# Patient Record
Sex: MALE | Race: WHITE
[De-identification: names, ages, dates, MRNs, and addresses within clinical notes are randomized per-mention and may not be internally consistent; named-entity substitution may affect disease eponyms.]

---

## 2017-06-01 ENCOUNTER — HOSPITAL ENCOUNTER (OUTPATIENT)
Dept: HOSPITAL 47 - SLEEP | Age: 67
Discharge: HOME | End: 2017-06-01
Payer: MEDICARE

## 2017-06-01 DIAGNOSIS — Z79.899: ICD-10-CM

## 2017-06-01 DIAGNOSIS — E66.9: ICD-10-CM

## 2017-06-01 DIAGNOSIS — G47.33: Primary | ICD-10-CM

## 2017-06-01 DIAGNOSIS — E78.5: ICD-10-CM

## 2017-06-01 DIAGNOSIS — I10: ICD-10-CM

## 2017-06-01 NOTE — PN
DATE OF SERVICE: 06/01/2017



A 66-year-old gentleman who has been followed in the sleep center for 

treatment of obstructive sleep apnea/hypopnea syndrome.  



Patient continued to use his treatment with CPAP every night for the 

whole night. I checked his CPAP unit. Patient using it 30 out of 30 

nights for more than 4 hours. Average usage is 8.2 hours. CPAP 

pressure is 10 cm of water. No snoring with the machine. [place] 

Sleepiness Scale is 8.  



MEDICATIONS: 

1. Metoprolol. 

2. Losartan. 

3. Nifedipine and 

4. Simvastatin. 



PHYSICAL EXAMINATION: 

GENERAL:  A pleasant patient in no distress. 

VITAL SIGNS:  /80, HR 74, RR 16. Height 5 feet 7 inches. Weight 

204. BMI 31.9. Neck 16-3/4. Oxygen saturation at room air 95%.  

HEENT:  PERRLA, EOMI.  Evaluation of oropharynx showed tongue 

protrudes midline, extremely low position of soft palate.   

NECK:  Supple.  No JVD.  Thyroid is not palpable. 

LUNGS:  Clear to percussion and to auscultation.  Good air exchange.  

No wheezing or rhonchi.  

HEART:  S1, S2 regular.  No murmurs, gallops, or rubs. 

ABDOMEN:  Obese, soft and nontender.  Bowel sounds are present.  No 

organomegaly appreciated.  

EXTREMITIES:  No clubbing or cyanosis. 

CNS:  Awake, alert, and oriented x3.  Cranial nerves 2 to 7 intact.  

There is no fasciculation or atrophy noted.   No focal deficits 

observed.  



IMPRESSION:

1. Obstructive sleep apnea-hypopnea syndrome. Patient demonstrated 

100% compliance with treatment with a pressure at 10 cm of water. No 

snoring. No sleepiness. Benefitting from treatment.  

2. Mild obesity; body mass index of 31.9. 

3. Hypertension. 

4. Hyperlipidemia. 

5. Obesity. 

6. Status post right knee surgery. 



PLAN:

1. Patient will continue to use his CPAP equipment every night for the 

whole night.  

2. Losing weight. 

3. Sleep hygiene with regular time in bed for at least 8 hours. 

4. No driving if feeling any sleepiness. 

5. Prescription for all necessary CPAP supplies, including mask, tube, 

filters with changing of supplies on a regular basis.  



Thank you very much for allowing me to participate in the management 

of your patient.  



Sincerely, 



Juan Jose Maloney MD, PhD, FAASM.

Diplomat of American Board of Sleep Medicine, 

Sleep Medicine Board by American Board of Medical Specialities

American Board of Internal Medicine

Medical Director of [facility] [place]

## 2018-08-02 ENCOUNTER — HOSPITAL ENCOUNTER (OUTPATIENT)
Dept: HOSPITAL 47 - SLEEP | Age: 68
Discharge: HOME | End: 2018-08-02
Attending: INTERNAL MEDICINE
Payer: MEDICARE

## 2018-08-02 DIAGNOSIS — Z98.890: ICD-10-CM

## 2018-08-02 DIAGNOSIS — E66.9: ICD-10-CM

## 2018-08-02 DIAGNOSIS — I10: ICD-10-CM

## 2018-08-02 DIAGNOSIS — G47.33: Primary | ICD-10-CM

## 2018-08-02 DIAGNOSIS — E78.5: ICD-10-CM

## 2018-08-02 DIAGNOSIS — Z99.89: ICD-10-CM

## 2018-08-02 DIAGNOSIS — Z79.899: ICD-10-CM

## 2018-08-02 NOTE — PN
PROGRESS NOTE



DATE OF SERVICE:

08/02/2018



A 67-year-old gentleman who has been followed in the Sleep Center for treatment of

obstructive sleep apnea-hypopnea syndrome.  Patient continued to use his CPAP equipment

every night without any significant problems.  No snoring with the machine.  [place]

Sleepiness Scale is 5.



I checked his CPAP unit.  Usage is 30/30 nights for more than 4 hours.  Average usage

8.4 hours.  CPAP pressure is 10 cm of water.



MEDICATIONS:

Metoprolol, losartan, nifedipine, simvastatin.



PHYSICAL EXAM:

Patient in no distress. /83, HR 72, RR 16, height 5, 7, weight 208.4, BMI 32.5.

Patient increased his weight on 4 pounds compared with the previous visit, temperature

97.2, oxygen saturation at room air 93%.

OROPHARYNX:  Extremely low position of soft palate.

HEART:  S1, S2. Regular.  Systolic murmur.

Neck  Supple, no JVD.  Thyroid is not palpable.

LUNGS  Clear to percussion and to auscultation.  Good air exchange.  No wheezing or

rhonchi.

ABDOMEN  Soft and nontender.  Bowel sounds are present.  No organomegaly appreciated.

EXTREMITIES  No clubbing or cyanosis.

CNS  Awake, alert, and oriented X3.  Cranial nerves 2 to 7 intact.  There is no

fasciculation or atrophy. noted.  No focal deficits observed.



IMPRESSION:

1. Obstructive sleep apnea-hypopnea syndrome.  Patient is using his CPAP equipment

    every night for the whole night, 100% compliance with treatment, benefitting from

    treatment.

2. Mild obesity.

3. Hypertension.

4. Hyperlipidemia.

5. Status post right knee surgery.



PLAN:

1. Patient will continue to use CPAP equipment every night.

2. Prescription for all necessary CPAP supplies including mask, tube, filters.

3. Losing weight.

4. Sleep hygiene with regular time in bed for at least 8 hours.

5. No driving if any sleepiness.

6. Followup visit in 1 year or earlier if patient has any problems.

Thank you very much for allowing me to participate in the management of your patient.



Sincerely,





Juan Jose Maloney MD, PhD, FAASM

Diplomat of American Board of Medical Specialties

American Board of Internal Medicine

Medical Director of [facility] [place]





MMODL / IJN: [id number] / Job#: [id number]

## 2020-12-21 ENCOUNTER — HOSPITAL ENCOUNTER (OUTPATIENT)
Dept: HOSPITAL 47 - RADCTMAIN | Age: 70
Discharge: HOME | End: 2020-12-21
Attending: INTERNAL MEDICINE
Payer: MEDICARE

## 2020-12-21 DIAGNOSIS — I71.2: Primary | ICD-10-CM

## 2020-12-21 DIAGNOSIS — Q25.43: ICD-10-CM

## 2020-12-21 DIAGNOSIS — N28.9: ICD-10-CM

## 2020-12-21 DIAGNOSIS — E78.2: ICD-10-CM

## 2020-12-21 DIAGNOSIS — D35.02: ICD-10-CM

## 2020-12-21 LAB
ALBUMIN SERPL-MCNC: 4.5 G/DL (ref 3.5–5)
ALP SERPL-CCNC: 75 U/L (ref 38–126)
ALT SERPL-CCNC: 29 U/L (ref 4–49)
ANION GAP SERPL CALC-SCNC: 6 MMOL/L
AST SERPL-CCNC: 26 U/L (ref 17–59)
BUN SERPL-SCNC: 16 MG/DL (ref 9–20)
CALCIUM SPEC-MCNC: 9.5 MG/DL (ref 8.4–10.2)
CHLORIDE SERPL-SCNC: 103 MMOL/L (ref 98–107)
CHOLEST SERPL-MCNC: 128 MG/DL (ref ?–200)
CO2 SERPL-SCNC: 31 MMOL/L (ref 22–30)
GLUCOSE SERPL-MCNC: 97 MG/DL (ref 74–99)
HDLC SERPL-MCNC: 54 MG/DL (ref 40–60)
LDLC SERPL CALC-MCNC: 52 MG/DL (ref 0–99)
POTASSIUM SERPL-SCNC: 4.6 MMOL/L (ref 3.5–5.1)
PROT SERPL-MCNC: 7.4 G/DL (ref 6.3–8.2)
SODIUM SERPL-SCNC: 140 MMOL/L (ref 137–145)
TRIGL SERPL-MCNC: 109 MG/DL (ref ?–150)

## 2020-12-21 PROCEDURE — 71275 CT ANGIOGRAPHY CHEST: CPT

## 2020-12-21 PROCEDURE — 80053 COMPREHEN METABOLIC PANEL: CPT

## 2020-12-21 PROCEDURE — 36415 COLL VENOUS BLD VENIPUNCTURE: CPT

## 2020-12-21 PROCEDURE — 80061 LIPID PANEL: CPT

## 2020-12-21 NOTE — CT
EXAMINATION TYPE: CT angio chest

 

DATE OF EXAM: 12/21/2020

 

COMPARISON: Radiograph 1/24/2019

 

HISTORY: 70-year-old male I71.2, Follow up thoracic aortic aneurysm

 

TECHNIQUE: Contiguous axial scanning of the chest performed with IV Contrast, patient injected with 1
00 mL of Isovue 370. Coronal/sagittal MIP reconstructions performed. 3-D reconstructions generated on
 a dedicated independent workstation.

 

CT DLP: 506.9 mGycm

Automated exposure control for dose reduction was used.

 

FINDINGS: 

Heart normal size without pericardial effusion. Mitral annular calcifications are noted.

 

Aortic root aneurysmal at 4.1 cm.

Ascending aorta aneurysmal at 4.5 cm.

Mild atherosclerotic arch calcifications with conventional arch vessel branching anatomy.

Upper descending thoracic aorta measures 2.9 cm.

Mid descending thoracic aorta measures 2.9 cm.

Ectatic aorta at the thoracoabdominal junction at 2.7 cm.

 

No thoracic lymphadenopathy by CT size criteria. Trace bilateral gynecomastia.

 

Some strandy atelectasis in the lower lungs. No consolidation or pleural effusion.

 

Scattered hepatic hypodensities, largest measuring 3.7 cm, probable cysts.

 

Additional cysts within both kidneys. Indeterminate lesion posterior midpole right kidney measuring 1
.9 cm has intermediate attenuation. Additional indeterminate lesion anterior upper pole left kidney m
easures 3.4 cm. Solid mass or hemorrhagic cyst are both considerations.

 

2.9 cm nodule left adrenal gland.

 

Bones: No osseous destructive process. Degenerative changes visualized cervical spine.

 

 

 

IMPRESSION: 

 

1. ANEURYSMAL AORTIC ROOT (4.1 CM) AND ASCENDING AORTA (4.5 CM).

 

2. BILATERAL RENAL LESIONS, LIKELY CYSTS. A 1.9 CM CORTICAL LESION ON THE RIGHT AND A 3.4 CM CORTICAL
 LESION ON THE LEFT ARE INDETERMINATE BETWEEN HEMORRHAGIC/PROTEINACEOUS CYSTS OR SOLID MASSES. 3-6 MO
NTH FOLLOW-UP CONTRAST ENHANCED KIDNEY CT RECOMMENDED TO REASSESS.

 

3. A 2.9 CM LEFT ADRENAL NODULE, STATISTICALLY REPRESENTS A BENIGN ADRENAL ADENOMA SHOULD ALSO BE MAHSA
SSESSED AT THAT TIME.

## 2021-07-19 ENCOUNTER — HOSPITAL ENCOUNTER (OUTPATIENT)
Dept: HOSPITAL 47 - LABWHC1 | Age: 71
Discharge: HOME | End: 2021-07-19
Attending: NURSE PRACTITIONER
Payer: MEDICARE

## 2021-07-19 DIAGNOSIS — E78.5: Primary | ICD-10-CM

## 2021-07-19 LAB
ALT SERPL-CCNC: 25 U/L (ref 10–49)
AST SERPL-CCNC: 24 U/L (ref 14–35)
CHOLEST SERPL-MCNC: 123 MG/DL (ref 0–200)
HDLC SERPL-MCNC: 48 MG/DL (ref 40–60)
LDLC SERPL CALC-MCNC: 55.4 MG/DL (ref 0–131)
TRIGL SERPL-MCNC: 98 MG/DL (ref 0–149)
VLDLC SERPL CALC-MCNC: 19.6 MG/DL (ref 5–40)

## 2021-07-19 PROCEDURE — 80061 LIPID PANEL: CPT

## 2021-07-19 PROCEDURE — 84450 TRANSFERASE (AST) (SGOT): CPT

## 2021-07-19 PROCEDURE — 36415 COLL VENOUS BLD VENIPUNCTURE: CPT

## 2021-07-19 PROCEDURE — 84460 ALANINE AMINO (ALT) (SGPT): CPT

## 2021-12-13 ENCOUNTER — HOSPITAL ENCOUNTER (OUTPATIENT)
Dept: HOSPITAL 47 - LABWHC1 | Age: 71
Discharge: HOME | End: 2021-12-13
Attending: INTERNAL MEDICINE
Payer: MEDICARE

## 2021-12-13 ENCOUNTER — HOSPITAL ENCOUNTER (OUTPATIENT)
Dept: HOSPITAL 47 - LABPAT | Age: 71
Discharge: HOME | End: 2021-12-13
Attending: ORTHOPAEDIC SURGERY
Payer: MEDICARE

## 2021-12-13 DIAGNOSIS — M17.12: Primary | ICD-10-CM

## 2021-12-13 DIAGNOSIS — E78.2: Primary | ICD-10-CM

## 2021-12-13 DIAGNOSIS — Z22.322: ICD-10-CM

## 2021-12-13 LAB
ALBUMIN SERPL-MCNC: 4.4 G/DL (ref 3.8–4.9)
ALBUMIN/GLOB SERPL: 1.82 G/DL (ref 1.6–3.17)
ALP SERPL-CCNC: 81 U/L (ref 41–126)
ALT SERPL-CCNC: 28 U/L (ref 10–49)
ANION GAP SERPL CALC-SCNC: 14.8 MMOL/L (ref 10–18)
AST SERPL-CCNC: 26 U/L (ref 14–35)
BUN SERPL-SCNC: 14.8 MG/DL (ref 9–27)
BUN/CREAT SERPL: 17.41 RATIO (ref 12–20)
CALCIUM SPEC-MCNC: 9.4 MG/DL (ref 8.7–10.3)
CHLORIDE SERPL-SCNC: 104 MMOL/L (ref 96–109)
CHOLEST SERPL-MCNC: 133 MG/DL (ref 0–200)
CO2 SERPL-SCNC: 22.9 MMOL/L (ref 20–27.5)
GLOBULIN SER CALC-MCNC: 2.4 G/DL (ref 1.6–3.3)
GLUCOSE SERPL-MCNC: 107 MG/DL (ref 70–110)
HDLC SERPL-MCNC: 52.1 MG/DL (ref 40–60)
LDLC SERPL CALC-MCNC: 60.9 MG/DL (ref 0–131)
POTASSIUM SERPL-SCNC: 4.2 MMOL/L (ref 3.5–5.5)
PROT SERPL-MCNC: 6.8 G/DL (ref 6.2–8.2)
SODIUM SERPL-SCNC: 141 MMOL/L (ref 135–145)
TRIGL SERPL-MCNC: 100 MG/DL (ref 0–149)
VLDLC SERPL CALC-MCNC: 20 MG/DL (ref 5–40)

## 2021-12-13 PROCEDURE — 87070 CULTURE OTHR SPECIMN AEROBIC: CPT

## 2021-12-13 PROCEDURE — 80061 LIPID PANEL: CPT

## 2021-12-13 PROCEDURE — 80053 COMPREHEN METABOLIC PANEL: CPT

## 2021-12-13 PROCEDURE — 36415 COLL VENOUS BLD VENIPUNCTURE: CPT

## 2022-01-16 NOTE — HP
HISTORY AND PHYSICAL



REASON FOR ADMISSION:

Surgery 01/17/2022



HISTORY OF PRESENT ILLNESS:

Richard Saez is a 71-year-old patient seen with symptomatic left knee osteoarthritis.

We discussed options for treatment.  He elected to proceed with left total knee

arthroplasty.  Consent was obtained.



PAST MEDICAL HISTORY:

Hypertension, hyperlipidemia.



PAST SURGICAL HISTORY:

Right total knee arthroplasty, tonsillectomy, right knee arthroscopy, no surgery.



MEDICATIONS:

Hyzaar, Procardia, simvastatin, Toprol, Tylenol.



ALLERGIES:

None.



SOCIAL HISTORY:

Denies tobacco use.



PHYSICAL EVALUATION OF THE LEFT KNEE:

Range of motion is -1/2 to 125.  Tenderness medial joint line.  Crepitus medial

patellofemoral compartment with range of motion.  Pain with patellofemoral compression.

Ligaments stable.  Hip rotation without pain.  Distal neurovascular exam is intact.



RADIOGRAPHS:

Left knee radiographs reveal severe osteoarthritic changes.



IMPRESSION:

1. Left knee osteoarthritis.

2. Hypertension.

3. Hyperlipidemia.



PLAN:

Left total knee arthroplasty. Surgery 01/17/2022.





MMODL / IJN: [id number] / Job#: [id number]

## 2022-01-17 ENCOUNTER — HOSPITAL ENCOUNTER (OUTPATIENT)
Dept: HOSPITAL 47 - OR | Age: 72
LOS: 1 days | Discharge: HOME HEALTH | End: 2022-01-18
Attending: ORTHOPAEDIC SURGERY
Payer: MEDICARE

## 2022-01-17 VITALS — BODY MASS INDEX: 32.8 KG/M2

## 2022-01-17 DIAGNOSIS — D72.829: ICD-10-CM

## 2022-01-17 DIAGNOSIS — Z20.822: ICD-10-CM

## 2022-01-17 DIAGNOSIS — I10: ICD-10-CM

## 2022-01-17 DIAGNOSIS — M17.12: Primary | ICD-10-CM

## 2022-01-17 DIAGNOSIS — E78.5: ICD-10-CM

## 2022-01-17 PROCEDURE — 73560 X-RAY EXAM OF KNEE 1 OR 2: CPT

## 2022-01-17 PROCEDURE — 64999 UNLISTED PX NERVOUS SYSTEM: CPT

## 2022-01-17 PROCEDURE — 97161 PT EVAL LOW COMPLEX 20 MIN: CPT

## 2022-01-17 PROCEDURE — 64448 NJX AA&/STRD FEM NRV NFS IMG: CPT

## 2022-01-17 PROCEDURE — 87635 SARS-COV-2 COVID-19 AMP PRB: CPT

## 2022-01-17 PROCEDURE — 85025 COMPLETE CBC W/AUTO DIFF WBC: CPT

## 2022-01-17 PROCEDURE — 88300 SURGICAL PATH GROSS: CPT

## 2022-01-17 PROCEDURE — 27447 TOTAL KNEE ARTHROPLASTY: CPT

## 2022-01-17 PROCEDURE — 76942 ECHO GUIDE FOR BIOPSY: CPT

## 2022-01-17 RX ADMIN — POTASSIUM CHLORIDE SCH MLS: 14.9 INJECTION, SOLUTION INTRAVENOUS at 11:25

## 2022-01-17 RX ADMIN — HYDROCODONE BITARTRATE AND ACETAMINOPHEN PRN EACH: 7.5; 325 TABLET ORAL at 23:53

## 2022-01-17 NOTE — XR
Left knee

 

HISTORY: Status post left knee arthroplasty

 

2 views the left knee

 

Patient is status post left knee arthroplasty. There is anatomic alignment. Soft tissue calcification
 may be vascular. Lucencies present within the soft tissues.

 

IMPRESSION: Orthopedic follow-up.

## 2022-01-17 NOTE — P.OP
Date of Procedure: 01/17/22


Preoperative Diagnosis: 


Left knee osteoarthritis


Postoperative Diagnosis: 


Left knee osteoarthritis


Procedure(s) Performed: 


Left total knee arthroplasty


Implants: 


1.  Depuy attune size 7 left cruciate retaining cemented femur


2.  Depuy attune size 7 fixed bearing cemented tibial baseplate


3.  Depuy attune size 7 fixed bearing 7 mm polyethylene tibial insert


4.  Depuy attune 38 mm all polyethylene cemented patella


Anesthesia: regional (Adductor canal catheter, Ipack block), spinal


Surgeon: Hector Hanks


Assistant #1: Partha Wagner


Estimated Blood Loss (ml): 50


Pathology: other (Bone)


Condition: stable


Disposition: PACU


Indications for Procedure: 


71-year-old patient seen with symptomatic left knee osteoarthritis.  After 

having treatment options discussed, he elected to proceed with total knee 

arthroplasty.


Operative Findings: 


See description of procedure


Description of Procedure: 


Patient was taken to the operative suite after having an adductor canal catheter

placed by the department of anesthesia.  Patient underwent a spinal anesthetic 

by the department of anesthesia.  Patient was given preoperative IV intake 

antibiotics and TXA.  A well-padded tourniquet was placed about the left lower 

extremity.  The lower extremity was then prepped and draped in the normal 

sterile orthopedic fashion.  The extremity was elevated, a tourniquet was 

insufflated to 300.  A standard anterior incision was made sharply through skin.

 Dissection was taken down through the subcutaneous soft tissues down to the 

extensor mechanism.  A medial arthrotomy was performed, patella was everted and 

knee was flexed.  There was advanced osteoarthritis noted.  I introduced my 

distal intramedullary femoral drill.  I then introduced the distal femoral 

cutting jig.  Reinier REESE secured the cutting jig with 2 pins.  I held 

retractors in position while Reinier REESE performed the distal femoral 

resection through the guide area we now removed her distal femoral cutting 

guide.  We now placed our 4-in-1 femoral cutting block and positioned and it was

secured with 2 pins by Reinier REESE while I held the block in position.  The 

distal femoral finishing was now completed.  A proximal tibial cutting guide was

positioned.  I held the guide in the appropriate position with both hands well 

Reinier REESE inserted stabilizing pins into the guide.  Proximal tibial cut 

was made. We now placed a trial femoral component into position, along with an 

appropriate size tibial tray and insert.  We now took the knee through range of 

motion and had full extension good flexion and good overall soft tissue balance 

noted.  The patella was everted and stabilized with 2 towel clips held by Reinier REESE while I performed a flush with patellar quad tendon utilizing a fresh 

sawblade.  We templated the patella, appropriate drill holes were made.  An 

appropriate trial patella was positioned, knee was taken through full range of 

motion with the patella tracking very nicely.  The trial patella was removed.  

Drill holes were made through the femoral component.  All trial components were 

removed after marking off the appropriate rotation of the tibia.  Retractors 

were now positioned along the proximal tibia.  An appropriate keel punch was 

made with the appropriate size tibial guide by myself on Reinier REESE assisted

by holding retractors.  At this point appropriate size implants were chosen and 

opened.  The joint was irrigated copiously with pulse lavage mechanical 

irrigation.  The posterior capsule was infiltrated with local analgesic.  The 

wound was irrigated with pulse lavage mechanical irrigation.  We mixed 

antibiotic methylmethacrylate.  We placed the knee into flexion.  We placed 

multiple retractors assisted by Reinier REESE to expose the proximal tibia.  

Once the methyl methacrylate was ready, the tibial component was cemented into 

place removing any excess methylmethacrylate form by both myself and Reinier REESE.  The femoral component was cemented into place removing the removing any 

excess methylmethacrylate performed by both myself and Reinier REESE.  We then 

inserted the appropriate size polyethylene tibial insert.  We made sure that it 

was locked into position.  We took the knee into full extension, and then back 

in a flexion making sure we had removed any excess methylmethacrylate.  The 

patellar component was then cemented down and secured with clamp.  Excess 

methylmethacrylate removed.  We kept the knee in full extension, patellar clamp 

in position until methylmethacrylate had hardened.  Once it had hardened the 

patellar clamp was removed.  The knee was taken through full range of motion.  

The patella tracked nicely.  There was good soft tissue balancing.  The 

tourniquet was now released.  Additional hemostasis was achieved via oscar

ctrocautery.  A second gram of TXA was given.  The wound again was irrigated 

with pulse lavage mechanical irrigation.  The superficial soft tissues were 

infiltrated local analgesic.  The extensor mechanism was repaired with Ethibond.

 We checked the repair with range of motion and it was stable.  The subcutaneous

soft tissues were repaired with Vicryl in layers.  The skin was approximated 

with pernio/Dermabond.  Sterile dressings were applied followed by loose web 

roll and Ace bandage.  The patient was transferred to a bed, and taken to 

recovery in stable and satisfactory condition.  Reinier REESE assisted with 

this complex procedure.

## 2022-01-17 NOTE — P.ANPRN
Procedure Note - Anesthesia





- Nerve Block Performed


  ** Left iPack Single


Time Out Performed: Yes (1158)


Date of Procedure: 01/17/22


Procedure Start Time: 12:07


Procedure Stop Time: 12:11


Location of Patient: PreOp


Indication: Acute Post-Operative Pain, Requested by Surgeon


Specifically requested for management of pain by DrAdolph: Hector Hanks


Sedation Type: Sedate with meaningful contact maintained


Preparation: Sterile Prep


Position: Supine


Catheter: None


Needle Types: Pajunk


Needle Gauge: 21


Ultrasound used to visualize needle placement: Yes


Ultrasound used to observe medication spread: Yes


Injectate: 0.5% Ropivacaine (see comment for volume) (15cc + 10cc nacl pf)


Blood Aspirated: No


Pain Paresthesia on Injection Noted: No


Resistance on Injection: Normal


Image Stored and Saved: Yes


Events: Uneventful and Well Tolerated

## 2022-01-17 NOTE — P.ANPRN
Procedure Note - Anesthesia





- Nerve Block Performed


  ** Left Adductor Canal Infusion


Time Out Performed: Yes (1158)


Date of Procedure: 01/17/22


Procedure Start Time: 11:59


Procedure Stop Time: 12:06


Location of Patient: PreOp


Indication: Acute Post-Operative Pain, Requested by Surgeon


Specifically requested for management of pain by DrAdolph: Hector Hanks


Sedation Type: Sedate with meaningful contact maintained


Preparation: Sterile Prep


Position: Supine


Catheter Depth at Skin (cm): 7


Catheter: Indwelling


Needle Types: Pajunk


Needle Gauge: 18


Ultrasound used to visualize needle placement: Yes


Ultrasound used to observe medication spread: Yes


Injectate: 0.5% Ropivacaine (see comment for volume) (15cc + 10cc nacl pf)


Blood Aspirated: No


Pain Paresthesia on Injection Noted: No


Resistance on Injection: Normal


Image Stored and Saved: Yes


Events: Uneventful and Well Tolerated

## 2022-01-18 VITALS — TEMPERATURE: 98.8 F | DIASTOLIC BLOOD PRESSURE: 78 MMHG | HEART RATE: 90 BPM | SYSTOLIC BLOOD PRESSURE: 153 MMHG

## 2022-01-18 VITALS — RESPIRATION RATE: 18 BRPM

## 2022-01-18 LAB
BASOPHILS # BLD AUTO: 0.03 X 10*3/UL (ref 0–0.1)
BASOPHILS NFR BLD AUTO: 0.2 %
EOSINOPHIL # BLD AUTO: 0 X 10*3/UL (ref 0.04–0.35)
EOSINOPHIL NFR BLD AUTO: 0 %
ERYTHROCYTE [DISTWIDTH] IN BLOOD BY AUTOMATED COUNT: 4.71 X 10*6/UL (ref 4.4–5.6)
ERYTHROCYTE [DISTWIDTH] IN BLOOD: 12.7 % (ref 11.5–14.5)
HCT VFR BLD AUTO: 42.2 % (ref 39.6–50)
HGB BLD-MCNC: 13.4 G/DL (ref 13–17)
LYMPHOCYTES # SPEC AUTO: 0.83 X 10*3/UL (ref 0.9–5)
LYMPHOCYTES NFR SPEC AUTO: 6.9 %
MCH RBC QN AUTO: 28.5 PG (ref 27–32)
MCHC RBC AUTO-ENTMCNC: 31.8 G/DL (ref 32–37)
MCV RBC AUTO: 89.6 FL (ref 80–97)
MONOCYTES # BLD AUTO: 1.25 X 10*3/UL (ref 0.2–1)
MONOCYTES NFR BLD AUTO: 10.3 %
NEUTROPHILS # BLD AUTO: 9.91 X 10*3/UL (ref 1.8–7.7)
NEUTROPHILS NFR BLD AUTO: 82 %
PLATELET # BLD AUTO: 168 X 10*3/UL (ref 140–440)
WBC # BLD AUTO: 12.09 X 10*3/UL (ref 4.5–10)

## 2022-01-18 RX ADMIN — POTASSIUM CHLORIDE SCH: 14.9 INJECTION, SOLUTION INTRAVENOUS at 06:55

## 2022-01-18 RX ADMIN — POTASSIUM CHLORIDE SCH MLS/HR: 14.9 INJECTION, SOLUTION INTRAVENOUS at 02:28

## 2022-01-18 RX ADMIN — POTASSIUM CHLORIDE SCH: 14.9 INJECTION, SOLUTION INTRAVENOUS at 11:12

## 2022-01-18 RX ADMIN — POTASSIUM CHLORIDE SCH: 14.9 INJECTION, SOLUTION INTRAVENOUS at 02:27

## 2022-01-18 RX ADMIN — HYDROCODONE BITARTRATE AND ACETAMINOPHEN PRN EACH: 7.5; 325 TABLET ORAL at 05:34

## 2022-01-18 RX ADMIN — HYDROCODONE BITARTRATE AND ACETAMINOPHEN PRN EACH: 7.5; 325 TABLET ORAL at 13:12

## 2022-01-18 NOTE — P.DS
Providers


Date of admission: 





01/17/2022


Expected date of discharge: 01/18/22


Attending physician: 


Hector Hanks





Consults: 





                                        





01/17/22 14:29


Consult Physician Routine 


   Consulting Provider: Thiago Martinez Reason/Comments: Medical management


   Do you want consulting provider notified?: Yes











Primary care physician: 


[facility] Course: 





Date of admission: 01/17/2022


Date of discharge: 01/18/2022





Admission diagnosis: Status post left total knee arthroplasty


Discharge diagnosis: Same





Attending physician: Dr. Hanks





Surgical procedures: Left total knee arthroplasty





Brief history: Patient is a 71-year-old male with a history of progressive 

primary left knee osteoarthritis.  At this point patient has failed conservative

treatment measures and has opted to proceed with a elective left total knee 

arthroplasty.





Hospital course: Details of patient's surgery can be found in operative report. 

Patient tolerated the procedure well and was subsequently transported to 

orthopedic floor.  Patient's orthopeidc and medical care was provided daily. 

Patient had daily laboratory tests performed for evaluation of overall blood 

counts.  Patient had daily physical therapy to include strengthening range of 

motion as well as education with walker ambulation. Patient was treated with 

Lovenox for their postoperative DVT prophylaxis during their inpatient stay.  

Patient was noted to have a relatively uneventful postoperative course.  Patient

reported satisfactory pain control with oral pain medications by postoperative 

day 0.  Patient showed satisfactory progress with physical therapy.  Patient 

moved steadily through the program and had no difficulty meeting the goals by 

postoperative day 1.  Given patient's otherwise satisfactory course and having 

met physical therapy goals, plan is to discharge patient home on postoperative 

day 1.





Discharge condition/disposition: Patient will be discharged home in stable 

condition.





Discharge medications: Instructions are given on resumption of patient's normal 

daily medications per primary care recommendation, in addition patient will be 

prescribed [place] 7.5 mg/325 mg, Colace 100 mg.





Discharge instructions:


1.  Wound care and infection precautions, keep incision dry and covered while 

showering, no lotions, creams, moisturizers. No soaking, tubs, pools, hottubs. 

Do not scrub over the incision.


2.  Weight-bear as tolerated with walker / cane until follow-up.


3.  Ice and elevate when necessary.  Do not exceed 20 minutes per hour with ice 

pack.


4.  Utilize compression sleeve until seen at first follow up appointment.


5.  Visiting nursing care.


6.  Home physical therapy including home CPM.


7.  Pain meds and anticoagulants per prescription.


8.  Pain medication has potential to cause constipation. Increase oral fluid and

fiber intake. Contact primary care provider if you have not had a bowel movement

within 48 hours after discharge


9.  No anti-inflammatory medication until discussed at first post operative 

visit, this including Motrin, Aleve, Mobic, Diclofenac.


10.  Follow up in office at 2 weeks postop with Reinier Wagner PA-C/Kip HURTADO


11. Follow up with your primary care doctor 7-10 days after discharge.


12. Contact [facility] with any questions, [phone].











Procedures: 





Left total knee arthroplasty


Patient Condition at Discharge: Good





Plan - Discharge Summary


Discharge Rx Participant: No


New Discharge Prescriptions: 


New


   Aspirin [Adult Low Dose Aspirin EC] 81 mg PO BID #60 tab


   Docusate [Colace] 100 mg PO DAILY #30 capsule


   HYDROcodone/APAP 7.5-325MG [Norco 7.5] 1 - 2 each PO Q6HR PRN #42 tab


     PRN Reason: Pain





Discontinued


   Aspirin 81 mg PO DAILY





No Action


   clonazePAM 0.5 mg PO HS


   Simvastatin 20 mg PO HS


   Spironolactone 25 mg PO DAILY


   NIFEdipine [NIFEdipine ER (Osmotic)] 60 mg PO QAM


   Metoprolol Succinate (ER) [Toprol Xl] 100 mg PO QAM


   Losartan/Hydrochlorothiazide [Losartan-Hctz 100-25 mg Tab] 1 tab PO QAM


   Cholecalciferol [Vitamin D3 (25 Mcg = 1000 Iu)] 50 mcg PO DAILY


Discharge Medication List





Cholecalciferol [Vitamin D3 (25 Mcg = 1000 Iu)] 50 mcg PO DAILY 01/07/22 

[History]


Losartan/Hydrochlorothiazide [Losartan-Hctz 100-25 mg Tab] 1 tab PO QAM 01/07/22

[History]


Metoprolol Succinate (ER) [Toprol Xl] 100 mg PO QAM 01/07/22 [History]


NIFEdipine [NIFEdipine ER (Osmotic)] 60 mg PO QAM 01/07/22 [History]


Simvastatin 20 mg PO HS 01/07/22 [History]


Spironolactone 25 mg PO DAILY 01/07/22 [History]


clonazePAM 0.5 mg PO HS 01/07/22 [History]


Aspirin [Adult Low Dose Aspirin EC] 81 mg PO BID #60 tab 01/18/22 [Rx]


Docusate [Colace] 100 mg PO DAILY #30 capsule 01/18/22 [Rx]


HYDROcodone/APAP 7.5-325MG [Norco 7.5] 1 - 2 each PO Q6HR PRN #42 tab 01/18/22 

[Rx]








Follow up Appointment(s)/Referral(s): 


[facility], [NON-STAFF] -  ([facility] will call you to schedule 

your in home nursing and physical therapy visits. )


Partha Wagner PAC [PHYSICIAN ASSISTANT] - 2 Weeks


Activity/Diet/Wound Care/Special Instructions: 


Orthopedic Discharge Instructions:


1.  Wound care and infection precautions, keep incision dry and covered while 

showering, no lotions, creams, moisturizers. No soaking, pools, hot tubs. Do not

scrub over incision.


2.  Weight-bear as tolerated with walker / cane until follow-up.


3.  Ice and elevate when necessary.  Do not exceed 20 minutes per hour with ice 

pack.


4.  Utilize compression sleeve until seen at first follow up appointment.


5.  Pain meds and anticoagulants per prescription.


6.  Pain medication has potential to cause constipation. Increase oral fluid and

fiber intake. Contact primary care provider if you have not had a bowel movement

within 48 hours after discharge.


7.  No anti-inflammatory medication until discussed at first post operative 

visit, this including Motrin, Aleve, Mobic, Diclofenac.


8. Follow up in office at 2 weeks postop with Reinier Wagner PA-C/Kip Cohen PA-C


9. Follow up with your primary care doctor 7-10 days after discharge.


10. Contact [facility] with any questions, [phone].





Discharge Disposition: HOME WITH HOME HEALTH SERVICES

## 2022-01-18 NOTE — P.PN
Progress Note - Text


Progress Note Date: 01/18/22 (692)





Anesthesiology


Postop day 1 status post total knee arthroplasty with adductor canal catheter.  

Patient doing well.  VAS 4 out of 10.  Gross strength intact in lower extremity.

 Eyes fever.  Denies alterations in sensorium.  Catheter site intact.





Heart regular rate


Lungs nonlabored


Abdomen nondistended





Assessment: Postop day 1 status post total knee arthroplasty with adductor canal

catheter


Plan: All questions answered.  Maintain catheter 2 more days with patient 

removal at home.  Instructions were given at discharge.This note was dictated 

using Dragon software.  Please be advised there is a potential for misspellings 

or errors in transcription.

## 2022-01-18 NOTE — P.PN
Subjective


Progress Note Date: 01/18/22


Principal diagnosis: 





Status post left total knee arthroplasty





Patient evaluated today at bedside, he is resting in his hospital bed.  Patient 

did very well with physical therapy.  His pain is well-controlled with current 

medication.  Currently denying any headaches, lightheadedness, chest pain or 

shortness of breath.





Objective





- Vital Signs


Vital signs: 


                                   Vital Signs











Temp  98.8 F   01/18/22 07:48


 


Pulse  90   01/18/22 07:48


 


Resp  18   01/18/22 08:00


 


BP  153/78   01/18/22 07:48


 


Pulse Ox  97   01/18/22 07:48








                                 Intake & Output











 01/17/22 01/18/22 01/18/22





 18:59 06:59 18:59


 


Intake Total 2101  


 


Output Total 50  


 


Balance 2051  


 


Weight 98.7 kg  


 


Intake:   


 


  IV 2101  


 


Output:   


 


  Estimated Blood Loss 50  


 


Other:   


 


  Voiding Method  Toilet Toilet





  Urinal Urinal


 


  # Voids  2 














- Exam





Left lower extremity:





Incision is clean, dry, and intact.  The foam dressing is in good condition.  

There is minimal soft tissue swelling and ecchymosis surrounding the medial and 

lateral aspects of the incision.  Calf is soft, no tenderness with palpation.  

Plantar flexion, dorsiflexion, EHL, FHL are intact.  Sensory exam to light touch

throughout the extremity is intact, dorsal pedis pulses 2+.








- Labs


CBC & Chem 7: 


                                 01/18/22 05:24





Labs: 


                  Abnormal Lab Results - Last 24 Hours (Table)











  01/18/22 Range/Units





  05:24 


 


WBC  12.09 H  (4.50-10.00)  X 10*3/uL


 


MCHC  31.8 L  (32.0-37.0)  g/dL


 


Immature Gran #  0.07 H  (0.00-0.04)  X 10*3/uL


 


Neutrophils #  9.91 H  (1.80-7.70)  X 10*3/uL


 


Lymphocytes #  0.83 L  (0.90-5.00)  X 10*3/uL


 


Monocytes #  1.25 H  (0.20-1.00)  X 10*3/uL


 


Eosinophils #  0 L  (0.04-0.35)  X 10*3/uL














Assessment and Plan


Assessment: 





Postoperative day #1 status post left total knee arthroplasty


Plan: 





Pain control, plan for discharge home on oral medication





DVT prophylaxis, aspirin 81 mg twice a day





Wound care instructions were discussed, this including icing and elevating along

with removal of the pain catheter when empty





Medical recommendations





Home physical therapy and nursing of discharge





Plan for discharge home today


Time with Patient: Less than 30

## 2022-01-18 NOTE — CONS
CONSULTATION



HISTORY OF PRESENT ILLNESS:

He is status post left knee replacement for medical management consult. He is having

no chest pain.  No shortness of breath.  No lightheadedness, syncope.



MEDICATIONS:

Home medicines include: Simvastatin 20 mg daily, spironolactone 25 mg daily, Klonopin

0.5 at night, nifedipine ER 60 mg daily, metoprolol succinate 100 mg daily, losartan

hydrochlorothiazide 100/25 daily.  He takes Norco 7.5, 1-2 every 6 hours.  Colace 100

mg daily, aspirin 81 daily.



PHYSICAL EXAMINATION:

Blood pressure is 150s to 160s over 78 to 95, O2 97 on room air, pulse 85 to 90,

respiratory 18-20.

Cardiovascular S1, S2.

Lungs clear.

GI soft.

Hematology: Negative Homans.



ASSESSMENT:

1. Status post knee replacement.

2. Hypertension.

3. Mild leukocytosis secondary to postop.

4. Hypertension acceleration.

Restart home medicines for blood pressure.  Monitor him.  He is having no chest pain or

shortness of breath.  He appears to be stable postop at this time.





MMODL / IJN: [id number] / Job#: [id number]

## 2022-07-05 ENCOUNTER — HOSPITAL ENCOUNTER (OUTPATIENT)
Dept: HOSPITAL 47 - LABWHC1 | Age: 72
Discharge: HOME | End: 2022-07-05
Attending: INTERNAL MEDICINE
Payer: MEDICARE

## 2022-07-05 DIAGNOSIS — E78.2: Primary | ICD-10-CM

## 2022-07-05 LAB
ALBUMIN SERPL-MCNC: 4.3 G/DL (ref 3.8–4.9)
ALBUMIN/GLOB SERPL: 1.54 G/DL (ref 1.6–3.17)
ALP SERPL-CCNC: 83 U/L (ref 41–126)
ALT SERPL-CCNC: 22 U/L (ref 10–49)
ANION GAP SERPL CALC-SCNC: 12.1 MMOL/L (ref 10–18)
AST SERPL-CCNC: 20 U/L (ref 14–35)
BUN SERPL-SCNC: 17.6 MG/DL (ref 9–27)
BUN/CREAT SERPL: 19.13 RATIO (ref 12–20)
CALCIUM SPEC-MCNC: 9.3 MG/DL (ref 8.7–10.3)
CHLORIDE SERPL-SCNC: 104 MMOL/L (ref 96–109)
CHOLEST SERPL-MCNC: 132 MG/DL (ref 0–200)
CO2 SERPL-SCNC: 22.8 MMOL/L (ref 20–27.5)
GLOBULIN SER CALC-MCNC: 2.8 G/DL (ref 1.6–3.3)
GLUCOSE SERPL-MCNC: 98 MG/DL (ref 70–110)
HDLC SERPL-MCNC: 49.7 MG/DL (ref 40–60)
LDLC SERPL CALC-MCNC: 63.4 MG/DL (ref 0–131)
POTASSIUM SERPL-SCNC: 4.3 MMOL/L (ref 3.5–5.5)
PROT SERPL-MCNC: 7.1 G/DL (ref 6.2–8.2)
SODIUM SERPL-SCNC: 139 MMOL/L (ref 135–145)
TRIGL SERPL-MCNC: 94.7 MG/DL (ref 0–149)
VLDLC SERPL CALC-MCNC: 18.94 MG/DL (ref 5–40)

## 2022-07-05 PROCEDURE — 36415 COLL VENOUS BLD VENIPUNCTURE: CPT

## 2022-07-05 PROCEDURE — 80061 LIPID PANEL: CPT

## 2022-07-05 PROCEDURE — 80053 COMPREHEN METABOLIC PANEL: CPT

## 2022-11-08 ENCOUNTER — HOSPITAL ENCOUNTER (OUTPATIENT)
Dept: HOSPITAL 47 - RADCTMAIN | Age: 72
Discharge: HOME | End: 2022-11-08
Attending: INTERNAL MEDICINE
Payer: MEDICARE

## 2022-11-08 DIAGNOSIS — N28.9: ICD-10-CM

## 2022-11-08 DIAGNOSIS — I71.21: Primary | ICD-10-CM

## 2022-11-08 DIAGNOSIS — D35.02: ICD-10-CM

## 2022-11-08 LAB — BUN SERPL-SCNC: 18 MG/DL (ref 9–20)

## 2022-11-08 PROCEDURE — 82565 ASSAY OF CREATININE: CPT

## 2022-11-08 PROCEDURE — 71275 CT ANGIOGRAPHY CHEST: CPT

## 2022-11-08 PROCEDURE — 84520 ASSAY OF UREA NITROGEN: CPT

## 2022-11-08 PROCEDURE — 36415 COLL VENOUS BLD VENIPUNCTURE: CPT

## 2022-11-08 NOTE — CT
EXAMINATION TYPE: CT angio chest

 

DATE OF EXAM: 11/8/2022 1:54 PM

 

COMPARISON: 12/21/2020

 

HISTORY: Follow up for thoracic aortic aneurysm.

 

CT DLP: 1204.9 mGycm

Automated exposure control for dose reduction was used.

 

CONTRAST: 

CTA scan of the thorax is performed without and with IV Contrast, patient injected with 100ml mL of I
sovue 370, pulmonary embolism protocol. .  

 

FINDINGS:

Heart normal size without pericardial effusion. Mitral annular calcifications are noted. Coronary art
juventino calcification noted. 1 to 2 mm multiple subpleural nodules are stable and likely benign.

 

Aortic root aneurysmal at 4.1 cm. Ascending aorta aneurysmal at 4.5 cm. Mild atherosclerotic arch kandy
cifications with conventional arch vessel branching anatomy. Upper descending thoracic aorta measures
 2.9 cm. Mid descending thoracic aorta measures 2.9 cm. Ectatic aorta at the thoracoabdominal junctio
n at 2.7 cm. 

No thoracic lymphadenopathy by CT size criteria. Trace bilateral gynecomastia. 

Some strandy atelectasis in the lower lungs. No consolidation or pleural effusion. 

 

Scattered hepatic hypodensities, largest measuring 3.7 cm, probable cysts. Additional cysts within jona
th kidneys. Indeterminate lesion posterior midpole right kidney measuring 1.9 cm has intermediate att
enuation. Additional indeterminate lesion anterior upper pole left kidney measures 3.4 cm. Solid mass
 or hemorrhagic cyst are both considerations. 2.9 cm nodule left adrenal gland. 

 

Bones: No osseous destructive process. Degenerative changes visualized cervical spine. 

 

 

IMPRESSION: 

1. ANEURYSMAL AORTIC ROOT (4.1 CM) AND ASCENDING AORTA (4.5 CM) WHICH IS STABLE. 

2. BILATERAL RENAL LESIONS, LIKELY CYSTS. A 1.9 CM CORTICAL LESION ON THE RIGHT AND A 3.4 CM CORTICAL
 LESION ON THE LEFT ARE INDETERMINATE BETWEEN HEMORRHAGIC/PROTEINACEOUS CYSTS OR SOLID MASSES. RECOMM
END FOLLOW-UP MRI. 

3. A 2.9 CM LEFT ADRENAL NODULE, STATISTICALLY REPRESENTS A BENIGN ADRENAL ADENOMA IS STABLE.

## 2022-12-22 ENCOUNTER — HOSPITAL ENCOUNTER (OUTPATIENT)
Dept: HOSPITAL 47 - LABWHC1 | Age: 72
Discharge: HOME | End: 2022-12-22
Attending: ALLERGY & IMMUNOLOGY
Payer: MEDICARE

## 2022-12-22 DIAGNOSIS — L29.8: ICD-10-CM

## 2022-12-22 DIAGNOSIS — L50.8: ICD-10-CM

## 2022-12-22 DIAGNOSIS — J31.0: Primary | ICD-10-CM

## 2022-12-22 DIAGNOSIS — R06.02: ICD-10-CM

## 2022-12-22 DIAGNOSIS — R05.3: ICD-10-CM

## 2022-12-22 LAB
ALBUMIN SERPL-MCNC: 4.5 G/DL (ref 3.8–4.9)
ALBUMIN/GLOB SERPL: 1.88 G/DL (ref 1.6–3.17)
ALP SERPL-CCNC: 87 U/L (ref 41–126)
ALT SERPL-CCNC: 28 U/L (ref 10–49)
ANION GAP SERPL CALC-SCNC: 14.6 MMOL/L (ref 10–18)
AST SERPL-CCNC: 25 U/L (ref 14–35)
BASOPHILS # BLD AUTO: 0.1 X 10*3/UL (ref 0–0.1)
BASOPHILS NFR BLD AUTO: 1.2 %
BUN SERPL-SCNC: 14.4 MG/DL (ref 9–27)
BUN/CREAT SERPL: 16 RATIO (ref 12–20)
CALCIUM SPEC-MCNC: 9.6 MG/DL (ref 8.7–10.3)
CHLORIDE SERPL-SCNC: 103 MMOL/L (ref 96–109)
CO2 SERPL-SCNC: 22.4 MMOL/L (ref 20–27.5)
EOSINOPHIL # BLD AUTO: 0.24 X 10*3/UL (ref 0.04–0.35)
EOSINOPHIL NFR BLD AUTO: 2.9 %
ERYTHROCYTE [DISTWIDTH] IN BLOOD BY AUTOMATED COUNT: 5.34 X 10*6/UL (ref 4.4–5.6)
ERYTHROCYTE [DISTWIDTH] IN BLOOD: 13.2 % (ref 11.5–14.5)
GLOBULIN SER CALC-MCNC: 2.4 G/DL (ref 1.6–3.3)
GLUCOSE SERPL-MCNC: 93 MG/DL (ref 70–110)
HCT VFR BLD AUTO: 46.9 % (ref 39.6–50)
HGB BLD-MCNC: 15.3 G/DL (ref 13–17)
IMM GRANULOCYTES BLD QL AUTO: 0.5 %
LYMPHOCYTES # SPEC AUTO: 1.44 X 10*3/UL (ref 0.9–5)
LYMPHOCYTES NFR SPEC AUTO: 17.4 %
MCH RBC QN AUTO: 28.7 PG (ref 27–32)
MCHC RBC AUTO-ENTMCNC: 32.6 G/DL (ref 32–37)
MCV RBC AUTO: 87.8 FL (ref 80–97)
MONOCYTES # BLD AUTO: 0.72 X 10*3/UL (ref 0.2–1)
MONOCYTES NFR BLD AUTO: 8.7 %
NEUTROPHILS # BLD AUTO: 5.72 X 10*3/UL (ref 1.8–7.7)
NEUTROPHILS NFR BLD AUTO: 69.3 %
NRBC BLD AUTO-RTO: 0 /100 WBCS (ref 0–0)
PLATELET # BLD AUTO: 199 X 10*3/UL (ref 140–440)
POTASSIUM SERPL-SCNC: 4.4 MMOL/L (ref 3.5–5.5)
PROT SERPL-MCNC: 6.9 G/DL (ref 6.2–8.2)
RHEUMATOID FACT SERPL-ACNC: <10 IU/ML (ref 0–15)
SODIUM SERPL-SCNC: 140 MMOL/L (ref 135–145)
T4 FREE SERPL-MCNC: 1.05 NG/DL (ref 0.8–1.8)
WBC # BLD AUTO: 8.26 X 10*3/UL (ref 4.5–10)

## 2022-12-22 PROCEDURE — 36415 COLL VENOUS BLD VENIPUNCTURE: CPT

## 2022-12-22 PROCEDURE — 86038 ANTINUCLEAR ANTIBODIES: CPT

## 2022-12-22 PROCEDURE — 86592 SYPHILIS TEST NON-TREP QUAL: CPT

## 2022-12-22 PROCEDURE — 85652 RBC SED RATE AUTOMATED: CPT

## 2022-12-22 PROCEDURE — 86376 MICROSOMAL ANTIBODY EACH: CPT

## 2022-12-22 PROCEDURE — 84443 ASSAY THYROID STIM HORMONE: CPT

## 2022-12-22 PROCEDURE — 84165 PROTEIN E-PHORESIS SERUM: CPT

## 2022-12-22 PROCEDURE — 86140 C-REACTIVE PROTEIN: CPT

## 2022-12-22 PROCEDURE — 84439 ASSAY OF FREE THYROXINE: CPT

## 2022-12-22 PROCEDURE — 86162 COMPLEMENT TOTAL (CH50): CPT

## 2022-12-22 PROCEDURE — 83520 IMMUNOASSAY QUANT NOS NONAB: CPT

## 2022-12-22 PROCEDURE — 87338 HPYLORI STOOL AG IA: CPT

## 2022-12-22 PROCEDURE — 86003 ALLG SPEC IGE CRUDE XTRC EA: CPT

## 2022-12-22 PROCEDURE — 86332 IMMUNE COMPLEX ASSAY: CPT

## 2022-12-22 PROCEDURE — 86431 RHEUMATOID FACTOR QUANT: CPT

## 2022-12-22 PROCEDURE — 80053 COMPREHEN METABOLIC PANEL: CPT

## 2022-12-22 PROCEDURE — 86334 IMMUNOFIX E-PHORESIS SERUM: CPT

## 2022-12-22 PROCEDURE — 85025 COMPLETE CBC W/AUTO DIFF WBC: CPT

## 2022-12-22 PROCEDURE — 86800 THYROGLOBULIN ANTIBODY: CPT

## 2022-12-22 PROCEDURE — 82785 ASSAY OF IGE: CPT

## 2022-12-23 LAB — CAT DANDER IGE QN: <0.1 KU/L

## 2023-07-17 ENCOUNTER — HOSPITAL ENCOUNTER (OUTPATIENT)
Dept: HOSPITAL 47 - LABWHC1 | Age: 73
Discharge: HOME | End: 2023-07-17
Attending: INTERNAL MEDICINE
Payer: MEDICARE

## 2023-07-17 DIAGNOSIS — E78.2: Primary | ICD-10-CM

## 2023-07-17 LAB
ALBUMIN SERPL-MCNC: 4.3 D/DL (ref 3.8–4.9)
ALBUMIN/GLOB SERPL: 2.05 RATIO (ref 1.6–3.17)
ALP SERPL-CCNC: 85 U/L (ref 41–126)
ALT SERPL-CCNC: 26 U/L (ref 10–49)
ANION GAP SERPL CALC-SCNC: 12 MMOL/L (ref 4–12)
AST SERPL-CCNC: 21 U/L (ref 14–35)
BUN SERPL-SCNC: 16.5 MG/DL (ref 9–27)
BUN/CREAT SERPL: 18.33 RATIO (ref 12–20)
CALCIUM SPEC-MCNC: 9.3 MG/DL (ref 8.7–10.3)
CHLORIDE SERPL-SCNC: 103 MMOL/L (ref 96–109)
CHOLEST SERPL-MCNC: 112 MG/DL (ref 0–200)
CO2 SERPL-SCNC: 27 MMOL/L (ref 21.6–31.8)
GLOBULIN SER CALC-MCNC: 2.1 D/DL (ref 1.6–3.3)
GLUCOSE SERPL-MCNC: 108 MG/DL (ref 70–110)
HDLC SERPL-MCNC: 46.4 MG/DL (ref 40–60)
LDLC SERPL CALC-MCNC: 50.9 MG/DL (ref 0–131)
POTASSIUM SERPL-SCNC: 3.8 MMOL/L (ref 3.5–5.5)
PROT SERPL-MCNC: 6.4 D/DL (ref 6.2–8.2)
SODIUM SERPL-SCNC: 142 MMOL/L (ref 135–145)
TRIGL SERPL-MCNC: 73.3 MG/DL (ref 0–149)
VLDLC SERPL CALC-MCNC: 14.66 MG/DL (ref 5–40)

## 2023-07-17 PROCEDURE — 80061 LIPID PANEL: CPT

## 2023-07-17 PROCEDURE — 36415 COLL VENOUS BLD VENIPUNCTURE: CPT

## 2023-07-17 PROCEDURE — 80053 COMPREHEN METABOLIC PANEL: CPT
